# Patient Record
Sex: FEMALE
[De-identification: names, ages, dates, MRNs, and addresses within clinical notes are randomized per-mention and may not be internally consistent; named-entity substitution may affect disease eponyms.]

---

## 2021-04-07 ENCOUNTER — NURSE TRIAGE (OUTPATIENT)
Dept: OTHER | Facility: CLINIC | Age: 41
End: 2021-04-07

## 2021-04-07 NOTE — TELEPHONE ENCOUNTER
Brief description of triage: No triage. Caller wanted to obtain a work excuse / documentation for  being off work today to care for son. 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 Caller states she will use [employer] to obtain work excuse. Care advice provided, patient verbalizes understanding; denies any other questions or concerns; instructed to call back for any new or worsening symptoms. This triage is a result of a call to [address] [place] [address]. Please do not respond to the triage nurse through this encounter. Any subsequent communication should be directly with the patient. Reason for Disposition   General information question, no triage required and triager able to answer question    Answer Assessment - Initial Assessment Questions  1. REASON FOR CALL or QUESTION: \"What is your reason for calling today? \" or \"How can I best help you? \" or \"What question do you have that I can help answer? 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 Caller states she will use [employer] to obtain work excuse.     Protocols used: INFORMATION ONLY CALL - NO TRIAGE-[facility]-

## 2021-07-03 ENCOUNTER — NURSE TRIAGE (OUTPATIENT)
Dept: OTHER | Facility: CLINIC | Age: 41
End: 2021-07-03

## 2021-07-03 NOTE — TELEPHONE ENCOUNTER
Reason for Disposition   SEVERE chest pain    Answer Assessment - Initial Assessment Questions  1. MECHANISM: \"How did the injury happen? \"      Pt was in a [address] accident at a [employer] [place]- ran into the back of another cart     2. ONSET: \"When did the injury happen? \" (Minutes or hours ago)      Today 7/3/2021 at 2-3 PM     3. LOCATION: \"Where on the chest is the injury located? \"      Left rib cage/chest injury     4. APPEARANCE: \"What does the injury look like? \"      Redness in the rib area - pt states the redness is  no longer there    5. BLEEDING: \"Is there any bleeding now? If so, ask: How long has it been bleeding? \"      Abrasions on the leg- superficial     6. SEVERITY: Lorrane Schwalbe difficulty with breathing? \"      \"feels uncomfortable when taking a breath in\"     7. SIZE: For cuts, bruises, or swelling, ask: \"How large is it? \" (e.g., inches or centimeters)      Denies     8. PAIN: \"Is there pain? \" If so, ask: \"How bad is the pain? \"   (e.g., Scale 1-10; or mild, moderate, severe)      Pt states the last left  rib feel \"further out\" - unable to bend over or bend forward   4-5/10    9. TETANUS: For any breaks in the skin, ask: \"When was the last tetanus booster? \"      [facility]ins [employer]illard - last 5 years     10. PREGNANCY: \"Is there any chance you are pregnant? \" \"When was your last menstrual period? \"        Denies    Protocols used: CHEST INJURY-ADULT-    Brief description of triage: rib injury. See assessment above. Triage indicates for patient to Go to ED now. Pt in agreement with this plan. Care advice provided, patient verbalizes understanding; denies any other questions or concerns; instructed to call back for any new or worsening symptoms. This triage is a result of a call to [address] [place] [address]. Please do not respond to the triage nurse through this encounter. Any subsequent communication should be directly with the patient.